# Patient Record
Sex: MALE | Race: WHITE
[De-identification: names, ages, dates, MRNs, and addresses within clinical notes are randomized per-mention and may not be internally consistent; named-entity substitution may affect disease eponyms.]

---

## 2020-08-17 PROBLEM — Z00.00 ENCOUNTER FOR PREVENTIVE HEALTH EXAMINATION: Status: ACTIVE | Noted: 2020-08-17

## 2020-09-15 ENCOUNTER — APPOINTMENT (OUTPATIENT)
Dept: NEUROLOGY | Facility: CLINIC | Age: 75
End: 2020-09-15
Payer: MEDICARE

## 2020-09-15 VITALS
WEIGHT: 143 LBS | SYSTOLIC BLOOD PRESSURE: 129 MMHG | DIASTOLIC BLOOD PRESSURE: 81 MMHG | TEMPERATURE: 97.1 F | HEIGHT: 66 IN | BODY MASS INDEX: 22.98 KG/M2 | HEART RATE: 59 BPM

## 2020-09-15 DIAGNOSIS — R26.89 OTHER ABNORMALITIES OF GAIT AND MOBILITY: ICD-10-CM

## 2020-09-15 DIAGNOSIS — Z74.09 OTHER REDUCED MOBILITY: ICD-10-CM

## 2020-09-15 PROCEDURE — 99204 OFFICE O/P NEW MOD 45 MIN: CPT

## 2020-09-15 RX ORDER — ATORVASTATIN CALCIUM 20 MG/1
20 TABLET, FILM COATED ORAL
Refills: 0 | Status: ACTIVE | COMMUNITY

## 2020-09-15 RX ORDER — ESCITALOPRAM OXALATE 5 MG/1
TABLET, FILM COATED ORAL
Refills: 0 | Status: ACTIVE | COMMUNITY

## 2020-09-15 RX ORDER — DESVENLAFAXINE SUCCINATE 25 MG/1
25 TABLET, EXTENDED RELEASE ORAL
Refills: 0 | Status: ACTIVE | COMMUNITY

## 2020-09-15 NOTE — HISTORY OF PRESENT ILLNESS
[FreeTextEntry1] : Naif Springer is a 75 year old man presenting with a history of multiple falls (5/9/20, 6/6/20, 6/22/20, and 8/3/20).  He presents today for a consultation. \par \par One year ago he had a fall due to uneven pavement.  Three years ago he started walking down a hill and was not able to control his speed and fell. He also had an episode of hypotension during that time.  On August 20th, it was nighttime and dark and he believes the fall was due to uneven pavement again.  He reports having a balance issue for a few years. He worked with physical therapy one year ago and his balance improved.   He reports that he catches his foot on the rug frequently. His friend recently gave him a walking stick but he does not use any assistive device and does not have with him today in the office.  His wife endorses that his center of gravity is abnormal, he leans forward and he shuffles. He denies any lightheadedness or dizziness with any of these falls. \par \par Mr. Springer endorses that his writing is messier and he has word finding difficulties occasionally. \par In 12/2017 he had neuropsychological testing which showed mild impairments in aspects of executive function. There has been no decline in cognitive function over the past three years. \par \par Other medical history of Dupuytren's contracture and intraatrial septal aneurysm, borderline glaucoma, and decreased hearing. \par \par Family history- father tremors. Sister- head and neck tremor. \par \par The remaining neurological review of systems is negative.

## 2020-09-15 NOTE — DATA REVIEWED
[de-identified] : 12/2017- Mild impairment in aspects of executive functioning. no evidence of primary memory or language disturbance.   [de-identified] : 6/22/20 CT head- images personally reviewed. 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 see report.

## 2020-09-15 NOTE — PHYSICAL EXAM
[FreeTextEntry1] : Physical examination \par General: No acute distress, Awake, Alert.   \par \par Mental status \par Awake, alert, gives detailed history.\par    \par Cranial Nerves \par II: VFF  \par III, IV, VI: PERRL, EOMI.   \par V: Facial sensation is normal B/L.   \par VII: Facial strength is normal B/L. \par \par \par VIII: Decreased hearing, bilaterally. \par \par IX, X: Palate is midline and elevates symmetrically.   \par XI: Trapezius normal strength.   \par XII: Tongue midline without atrophy or fasciculations. \par \par Motor exam  \par Muscle tone - Minimal cogwheel in arms B. \par No atrophy or fasciculations \par Muscle Strength: arms and legs, proximal and distal flexors and extensors are normal \par No bradykinesia. \par Very low amplitude, high frequency tremor with action in the hands. \par No rest tremor. \par No UE drift.\par \par Reflexes \par All present, normal, and symmetrical.   \par \par Plantars right: mute.   \par Plantars left: mute.   \par \par Coordination \par Finger to nose: Normal.  \par Heel to shin: Normal.   \par \par Slow, no ataxia - FNF, SENDY, HKS. \par \par Sensory \par Intact sensation to vibration and PP.\par \par Gait \par Slight flexed posture - may me musculoskeletal.  No shuffling.  Good stride.  No turn en bloc. Difficulty with tandem. \par \par

## 2020-09-15 NOTE — ASSESSMENT
[FreeTextEntry1] : Naif Springer is a 75 year old man with a gait abnormality which is multifactorial - age, glaucoma, decreased hearing, microvascular ischemic white matter changes. \par Clinically, there is no sign of any specific neurological disease contributing to his gait abnormality. There is no evidence of a myelopathy, polyneuropathy or Parkinson Disease.\par \par Gait- \par PT referral for gait and balance training, and fall prevention.\par \par Continue to follow clinically.\par \par Follow up in 6 months.

## 2020-09-15 NOTE — DATA REVIEWED
[de-identified] : 12/2017- Mild impairment in aspects of executive functioning. no evidence of primary memory or language disturbance.   [de-identified] : 6/22/20 CT head- images personally reviewed. 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 see report.

## 2021-02-16 ENCOUNTER — APPOINTMENT (OUTPATIENT)
Dept: NEUROLOGY | Facility: CLINIC | Age: 76
End: 2021-02-16

## 2021-03-04 ENCOUNTER — APPOINTMENT (OUTPATIENT)
Dept: ORTHOPEDIC SURGERY | Facility: CLINIC | Age: 76
End: 2021-03-04
Payer: MEDICARE

## 2021-03-04 VITALS — HEIGHT: 66 IN | WEIGHT: 143 LBS | RESPIRATION RATE: 16 BRPM | BODY MASS INDEX: 22.98 KG/M2

## 2021-03-04 DIAGNOSIS — Z80.0 FAMILY HISTORY OF MALIGNANT NEOPLASM OF DIGESTIVE ORGANS: ICD-10-CM

## 2021-03-04 DIAGNOSIS — S69.82XD OTHER SPECIFIED INJURIES OF LEFT WRIST, HAND AND FINGER(S), SUBSEQUENT ENCOUNTER: ICD-10-CM

## 2021-03-04 DIAGNOSIS — Z78.9 OTHER SPECIFIED HEALTH STATUS: ICD-10-CM

## 2021-03-04 DIAGNOSIS — M72.0 PALMAR FASCIAL FIBROMATOSIS [DUPUYTREN]: ICD-10-CM

## 2021-03-04 DIAGNOSIS — M19.132 POST-TRAUMATIC OSTEOARTHRITIS, LEFT WRIST: ICD-10-CM

## 2021-03-04 DIAGNOSIS — Z80.8 FAMILY HISTORY OF MALIGNANT NEOPLASM OF OTHER ORGANS OR SYSTEMS: ICD-10-CM

## 2021-03-04 DIAGNOSIS — Z80.3 FAMILY HISTORY OF MALIGNANT NEOPLASM OF BREAST: ICD-10-CM

## 2021-03-04 DIAGNOSIS — M18.0 BILATERAL PRIMARY OSTEOARTHRITIS OF FIRST CARPOMETACARPAL JOINTS: ICD-10-CM

## 2021-03-04 PROCEDURE — 73110 X-RAY EXAM OF WRIST: CPT | Mod: 50

## 2021-03-04 PROCEDURE — 99204 OFFICE O/P NEW MOD 45 MIN: CPT
